# Patient Record
Sex: MALE | Race: BLACK OR AFRICAN AMERICAN | HISPANIC OR LATINO | Employment: FULL TIME | URBAN - METROPOLITAN AREA
[De-identification: names, ages, dates, MRNs, and addresses within clinical notes are randomized per-mention and may not be internally consistent; named-entity substitution may affect disease eponyms.]

---

## 2017-12-28 ENCOUNTER — GENERIC CONVERSION - ENCOUNTER (OUTPATIENT)
Dept: OTHER | Facility: OTHER | Age: 22
End: 2017-12-28

## 2018-01-23 NOTE — MISCELLANEOUS
Please contact our office at your convenience  It is important that we speak to you  REGARDING:   Porfavor contacte a  nuestra oficina  Es muy importante que hablemos con usted   SOBRE:          Scheduling an Appointment/ Programar erasmo Sylwia          Rescheduling  an Appointment/ Re-programar erasmo Sylwia     Cancelling an Appointment/Cancelar amado Sylwia     Your Lab/ X-ray Results/Resultados         Updating your Phone number or Address/ Actualizar  amado [employer]          X Verify your Primary Providor/ Verificar amado Proveedor primario     Other/Otro:     Please Contact Our Office to Schedule Your Appointment  It is Very Important That You See Your Provider for your  Routine Medical Care  If you are seeing a New Providor,  Please Contact our Office so we can update our Records  Thank You  Comuníquese con nuestra oficina para programar amado  sylwia  Es Muy Importante que usted nery amado proveedor para amado   [address] [facility] [place]  Si usted esta viendo un Proveedor  [place], [place] con Osorio Lele oficina para  actualizar   nuestro registros  [place]

## 2018-06-26 ENCOUNTER — HOSPITAL ENCOUNTER (EMERGENCY)
Facility: HOSPITAL | Age: 23
Discharge: HOME | End: 2018-06-26
Attending: EMERGENCY MEDICINE | Admitting: EMERGENCY MEDICINE

## 2018-06-26 VITALS
DIASTOLIC BLOOD PRESSURE: 70 MMHG | TEMPERATURE: 98.9 F | BODY MASS INDEX: 20.25 KG/M2 | OXYGEN SATURATION: 98 % | RESPIRATION RATE: 16 BRPM | SYSTOLIC BLOOD PRESSURE: 106 MMHG | HEART RATE: 68 BPM | WEIGHT: 137.13 LBS

## 2018-06-26 DIAGNOSIS — R11.0 NAUSEA: ICD-10-CM

## 2018-06-26 DIAGNOSIS — M79.10 GENERALIZED MUSCLE ACHE: Primary | ICD-10-CM

## 2018-06-26 LAB
ALBUMIN SERPL BCP-MCNC: 4.5 G/DL (ref 3.5–5)
ALP SERPL-CCNC: 52 U/L (ref 46–116)
ALT SERPL W P-5'-P-CCNC: 21 U/L (ref 12–78)
AMORPH PHOS CRY URNS QL MICRO: ABNORMAL /HPF
ANION GAP SERPL CALCULATED.3IONS-SCNC: 10 MMOL/L (ref 4–13)
AST SERPL W P-5'-P-CCNC: 21 U/L (ref 5–45)
BACTERIA UR QL AUTO: ABNORMAL /HPF
BASOPHILS # BLD AUTO: 0.02 THOUSANDS/ΜL (ref 0–0.1)
BASOPHILS NFR BLD AUTO: 0 % (ref 0–1)
BILIRUB SERPL-MCNC: 0.62 MG/DL (ref 0.2–1)
BILIRUB UR QL STRIP: ABNORMAL
BUN SERPL-MCNC: 11 MG/DL (ref 5–25)
CALCIUM SERPL-MCNC: 9.4 MG/DL (ref 8.3–10.1)
CHLORIDE SERPL-SCNC: 101 MMOL/L (ref 100–108)
CK MB SERPL-MCNC: <0.5 NG/ML (ref 0–5)
CK MB SERPL-MCNC: <1 % (ref 0–2.5)
CK SERPL-CCNC: 495 U/L (ref 39–308)
CLARITY UR: ABNORMAL
CO2 SERPL-SCNC: 28 MMOL/L (ref 21–32)
COLOR UR: YELLOW
CREAT SERPL-MCNC: 0.86 MG/DL (ref 0.6–1.3)
EOSINOPHIL # BLD AUTO: 0.01 THOUSAND/ΜL (ref 0–0.61)
EOSINOPHIL NFR BLD AUTO: 0 % (ref 0–6)
ERYTHROCYTE [DISTWIDTH] IN BLOOD BY AUTOMATED COUNT: 13.2 % (ref 11.6–15.1)
GFR SERPL CREATININE-BSD FRML MDRD: 141 ML/MIN/1.73SQ M
GLUCOSE SERPL-MCNC: 94 MG/DL (ref 65–140)
GLUCOSE UR STRIP-MCNC: NEGATIVE MG/DL
HCT VFR BLD AUTO: 41.1 % (ref 36.5–49.3)
HGB BLD-MCNC: 14.3 G/DL (ref 12–17)
HGB UR QL STRIP.AUTO: NEGATIVE
KETONES UR STRIP-MCNC: ABNORMAL MG/DL
LEUKOCYTE ESTERASE UR QL STRIP: NEGATIVE
LIPASE SERPL-CCNC: 77 U/L (ref 73–393)
LYMPHOCYTES # BLD AUTO: 1.27 THOUSANDS/ΜL (ref 0.6–4.47)
LYMPHOCYTES NFR BLD AUTO: 8 % (ref 14–44)
MCH RBC QN AUTO: 31.3 PG (ref 26.8–34.3)
MCHC RBC AUTO-ENTMCNC: 34.8 G/DL (ref 31.4–37.4)
MCV RBC AUTO: 90 FL (ref 82–98)
MONOCYTES # BLD AUTO: 1.47 THOUSAND/ΜL (ref 0.17–1.22)
MONOCYTES NFR BLD AUTO: 9 % (ref 4–12)
MUCOUS THREADS UR QL AUTO: ABNORMAL
NEUTROPHILS # BLD AUTO: 13.37 THOUSANDS/ΜL (ref 1.85–7.62)
NEUTS SEG NFR BLD AUTO: 83 % (ref 43–75)
NITRITE UR QL STRIP: NEGATIVE
NON-SQ EPI CELLS URNS QL MICRO: ABNORMAL /HPF
NRBC BLD AUTO-RTO: 0 /100 WBCS
OTHER STN SPEC: ABNORMAL
PH UR STRIP.AUTO: 7 [PH] (ref 4.5–8)
PLATELET # BLD AUTO: 182 THOUSANDS/UL (ref 149–390)
PMV BLD AUTO: 9.4 FL (ref 8.9–12.7)
POTASSIUM SERPL-SCNC: 3.8 MMOL/L (ref 3.5–5.3)
PROT SERPL-MCNC: 7.7 G/DL (ref 6.4–8.2)
PROT UR STRIP-MCNC: ABNORMAL MG/DL
RBC # BLD AUTO: 4.57 MILLION/UL (ref 3.88–5.62)
RBC #/AREA URNS AUTO: ABNORMAL /HPF
SODIUM SERPL-SCNC: 139 MMOL/L (ref 136–145)
SP GR UR STRIP.AUTO: 1.02 (ref 1–1.03)
UROBILINOGEN UR QL STRIP.AUTO: 4 E.U./DL
WBC # BLD AUTO: 16.14 THOUSAND/UL (ref 4.31–10.16)
WBC #/AREA URNS AUTO: ABNORMAL /HPF

## 2018-06-26 PROCEDURE — 99284 EMERGENCY DEPT VISIT MOD MDM: CPT

## 2018-06-26 PROCEDURE — 86618 LYME DISEASE ANTIBODY: CPT | Performed by: PHYSICIAN ASSISTANT

## 2018-06-26 PROCEDURE — 81002 URINALYSIS NONAUTO W/O SCOPE: CPT | Performed by: PHYSICIAN ASSISTANT

## 2018-06-26 PROCEDURE — 82553 CREATINE MB FRACTION: CPT | Performed by: PHYSICIAN ASSISTANT

## 2018-06-26 PROCEDURE — 36415 COLL VENOUS BLD VENIPUNCTURE: CPT | Performed by: PHYSICIAN ASSISTANT

## 2018-06-26 PROCEDURE — 82550 ASSAY OF CK (CPK): CPT | Performed by: PHYSICIAN ASSISTANT

## 2018-06-26 PROCEDURE — 83690 ASSAY OF LIPASE: CPT | Performed by: PHYSICIAN ASSISTANT

## 2018-06-26 PROCEDURE — 80053 COMPREHEN METABOLIC PANEL: CPT | Performed by: PHYSICIAN ASSISTANT

## 2018-06-26 PROCEDURE — 96374 THER/PROPH/DIAG INJ IV PUSH: CPT

## 2018-06-26 PROCEDURE — 81001 URINALYSIS AUTO W/SCOPE: CPT

## 2018-06-26 PROCEDURE — 96375 TX/PRO/DX INJ NEW DRUG ADDON: CPT

## 2018-06-26 PROCEDURE — 85025 COMPLETE CBC W/AUTO DIFF WBC: CPT | Performed by: PHYSICIAN ASSISTANT

## 2018-06-26 PROCEDURE — 86617 LYME DISEASE ANTIBODY: CPT | Performed by: PHYSICIAN ASSISTANT

## 2018-06-26 PROCEDURE — 96361 HYDRATE IV INFUSION ADD-ON: CPT

## 2018-06-26 RX ORDER — KETOROLAC TROMETHAMINE 30 MG/ML
30 INJECTION, SOLUTION INTRAMUSCULAR; INTRAVENOUS ONCE
Status: COMPLETED | OUTPATIENT
Start: 2018-06-26 | End: 2018-06-26

## 2018-06-26 RX ORDER — ACETAMINOPHEN 325 MG/1
975 TABLET ORAL ONCE
Status: COMPLETED | OUTPATIENT
Start: 2018-06-26 | End: 2018-06-26

## 2018-06-26 RX ORDER — ONDANSETRON 4 MG/1
TABLET, FILM COATED ORAL
Qty: 12 TABLET | Refills: 0 | Status: SHIPPED | OUTPATIENT
Start: 2018-06-26

## 2018-06-26 RX ORDER — ONDANSETRON 2 MG/ML
4 INJECTION INTRAMUSCULAR; INTRAVENOUS ONCE
Status: COMPLETED | OUTPATIENT
Start: 2018-06-26 | End: 2018-06-26

## 2018-06-26 RX ADMIN — KETOROLAC TROMETHAMINE 30 MG: 30 INJECTION, SOLUTION INTRAMUSCULAR at 07:53

## 2018-06-26 RX ADMIN — ONDANSETRON 4 MG: 2 INJECTION INTRAMUSCULAR; INTRAVENOUS at 07:56

## 2018-06-26 RX ADMIN — SODIUM CHLORIDE 1000 ML: 0.9 INJECTION, SOLUTION INTRAVENOUS at 07:42

## 2018-06-26 RX ADMIN — SODIUM CHLORIDE 1000 ML: 0.9 INJECTION, SOLUTION INTRAVENOUS at 09:14

## 2018-06-26 RX ADMIN — FAMOTIDINE 20 MG: 10 INJECTION, SOLUTION INTRAVENOUS at 07:59

## 2018-06-26 RX ADMIN — ACETAMINOPHEN 975 MG: 325 TABLET, FILM COATED ORAL at 08:02

## 2018-06-26 NOTE — ED PROVIDER NOTES
History  Chief Complaint   Patient presents with    Muscle Pain     onset 3 days ago with "bone and muscles are aching from my legs all the way up"  Denies known injury or history of such s/s  Reports intermittent headaches and nausea  Ambulatory with steady gait from waiting room to ED room  Patient is a 70-year-old male who reports starting approximately 6 days ago with generalized arthralgias and myalgias mostly legs/back/neck stating that the pain has been constant, worse with movement and range of motion  Patient did try Motrin and Aleve with no improvement  He has had some chills but denies fevers  Also has had intermittent headaches  Yesterday developed intermittent epigastric pain  Overall appetite is decreased  Denies blurred vision, photophobia, congestion, sore throat, cough, short chest pain, shortness of breath  Further denies vomiting, diarrhea, urinary symptoms  Significant medical history denied and patient takes no regular medications  None       Past Medical History:   Diagnosis Date    Asthma        History reviewed  No pertinent surgical history  History reviewed  No pertinent family history  I have reviewed and agree with the history as documented  Social History   Substance Use Topics    Smoking status: Current Every Day Smoker     Packs/day: 0 50    Smokeless tobacco: Not on file    Alcohol use Yes      Comment: socially        Review of Systems   Constitutional: Positive for chills  Negative for fever  HENT: Negative for congestion, rhinorrhea, sinus pain and sore throat  Eyes: Negative for visual disturbance  Respiratory: Negative for cough, shortness of breath and wheezing  Cardiovascular: Negative for chest pain  Gastrointestinal: Positive for abdominal pain and nausea  Negative for diarrhea and vomiting  Musculoskeletal: Positive for arthralgias and myalgias  Negative for neck stiffness  Neurological: Positive for headaches   Negative for dizziness  All other systems reviewed and are negative  Physical Exam  Physical Exam   Constitutional: He is oriented to person, place, and time  He appears well-developed and well-nourished  No distress  HENT:   Head: Normocephalic and atraumatic  Right Ear: Hearing, tympanic membrane, external ear and ear canal normal    Left Ear: Hearing, tympanic membrane, external ear and ear canal normal    Nose: Nose normal  No mucosal edema or rhinorrhea  Right sinus exhibits no maxillary sinus tenderness  Left sinus exhibits no maxillary sinus tenderness  Mouth/Throat: Uvula is midline, oropharynx is clear and moist and mucous membranes are normal  No oropharyngeal exudate  Eyes: Conjunctivae, EOM and lids are normal  Pupils are equal, round, and reactive to light  Neck: Normal range of motion  Neck supple  No Brudzinski's sign and no Kernig's sign noted  No cervical mid-line TTP, no paraspinal muscle TTP   Cardiovascular: Normal rate, regular rhythm and normal heart sounds  Pulmonary/Chest: Effort normal and breath sounds normal    Abdominal: Soft  Normal appearance and bowel sounds are normal  There is tenderness (mild epigastric TTP) in the epigastric area  There is no rigidity, no rebound and no guarding  Musculoskeletal:   Normal gait and station  Non-focal with FROM upper, lower extremities, neck, chest and back   Lymphadenopathy:     He has no cervical adenopathy  Neurological: He is alert and oriented to person, place, and time  Appropriate speech and behavior   Non-focal  No sensory deficits noted, no motor deficits noted       Vital Signs  ED Triage Vitals [06/26/18 0705]   Temperature Pulse Respirations Blood Pressure SpO2   99 8 °F (37 7 °C) 91 16 111/68 97 %      Temp Source Heart Rate Source Patient Position - Orthostatic VS BP Location FiO2 (%)   Oral Monitor Sitting Right arm --      Pain Score       7           Vitals:    06/26/18 0705 06/26/18 0900 06/26/18 0955   BP: 111/68 107/51 (!) 104/47   Pulse: 91 80 68   Patient Position - Orthostatic VS: Sitting Lying Lying       Visual Acuity      ED Medications  Medications   sodium chloride 0 9 % bolus 1,000 mL (0 mL Intravenous Stopped 6/26/18 0843)   ketorolac (TORADOL) injection 30 mg (30 mg Intravenous Given 6/26/18 0753)   ondansetron (ZOFRAN) injection 4 mg (4 mg Intravenous Given 6/26/18 0756)   famotidine (PEPCID) injection 20 mg (20 mg Intravenous Given 6/26/18 0759)   acetaminophen (TYLENOL) tablet 975 mg (975 mg Oral Given 6/26/18 0802)   sodium chloride 0 9 % bolus 1,000 mL (1,000 mL Intravenous New Bag 6/26/18 0914)       Diagnostic Studies  Results Reviewed     Procedure Component Value Units Date/Time    CKMB [[id number]]  (Normal) Collected:  06/26/18 0735    Lab Status:  Final result Specimen:  Blood from Arm, Right Updated:  06/26/18 0927     CK-MB Index <1 0 %      CK-MB FRACTION <0 5 ng/mL     Lipase [[id number]]  (Normal) Collected:  06/26/18 0735    Lab Status:  Final result Specimen:  Blood from Arm, Right Updated:  06/26/18 0845     Lipase 77 u/L     Urine Microscopic [[id number]]  (Abnormal) Collected:  06/26/18 0823    Lab Status:  Final result Specimen:  Urine from Urine, Clean Catch Updated:  06/26/18 0841     RBC, UA 2-4 (A) /hpf      WBC, UA 1-2 (A) /hpf      Epithelial Cells Occasional /hpf      Bacteria, UA Occasional /hpf      AMORPH PHOSPATES Moderate /hpf      OTHER OBSERVATIONS Yeast Cells Present     MUCOUS THREADS Moderate (A)    CK Total with Reflex CKMB [[id number]]  (Abnormal) Collected:  06/26/18 0735    Lab Status:  Final result Specimen:  Blood from Arm, Right Updated:  06/26/18 0835     Total  (H) U/L     POCT urinalysis dipstick [[id number]]  (Abnormal) Resulted:  06/26/18 0820    Lab Status:  Final result Specimen:  Urine Updated:  06/26/18 0820    Comprehensive metabolic panel [[id number]] Collected:  06/26/18 0735    Lab Status:  Final result Specimen:  Blood from Arm, Right Updated:  06/26/18 1174 Sodium 139 mmol/L      Potassium 3 8 mmol/L      Chloride 101 mmol/L      CO2 28 mmol/L      Anion Gap 10 mmol/L      BUN 11 mg/dL      Creatinine 0 86 mg/dL      Glucose 94 mg/dL      Calcium 9 4 mg/dL      AST 21 U/L      ALT 21 U/L      Alkaline Phosphatase 52 U/L      Total Protein 7 7 g/dL      Albumin 4 5 g/dL      Total Bilirubin 0 62 mg/dL      eGFR 141 ml/min/1 73sq m     Narrative:         National Kidney Disease Education Program recommendations are as follows:  GFR calculation is accurate only with a steady state creatinine  Chronic Kidney disease less than 60 ml/min/1 73 sq  meters  Kidney failure less than 15 ml/min/1 73 sq  meters      ED Urine Macroscopic [[id number]]  (Abnormal) Collected:  06/26/18 0823    Lab Status:  Final result Specimen:  Urine Updated:  06/26/18 0818     Color, UA Yellow     Clarity, UA Cloudy     pH, UA 7 0     Leukocytes, UA Negative     Nitrite, UA Negative     Protein, UA 30 (1+) (A) mg/dl      Glucose, UA Negative mg/dl      Ketones, UA 15 (1+) (A) mg/dl      Urobilinogen, UA 4 0 (A) E U /dl      Bilirubin, UA Interference- unable to analyze (A)     Blood, UA Negative     Specific Gravity, UA 1 025    Narrative:       CLINITEK RESULT    CBC and differential [[id number]]  (Abnormal) Collected:  06/26/18 0735    Lab Status:  Final result Specimen:  Blood from Arm, Right Updated:  06/26/18 0749     WBC 16 14 (H) Thousand/uL      RBC 4 57 Million/uL      Hemoglobin 14 3 g/dL      Hematocrit 41 1 %      MCV 90 fL      MCH 31 3 pg      MCHC 34 8 g/dL      RDW 13 2 %      MPV 9 4 fL      Platelets 656 Thousands/uL      nRBC 0 /100 WBCs      Neutrophils Relative 83 (H) %      Lymphocytes Relative 8 (L) %      Monocytes Relative 9 %      Eosinophils Relative 0 %      Basophils Relative 0 %      Neutrophils Absolute 13 37 (H) Thousands/µL      Lymphocytes Absolute 1 27 Thousands/µL      Monocytes Absolute 1 47 (H) Thousand/µL      Eosinophils Absolute 0 01 Thousand/µL      Basophils Absolute 0 02 Thousands/µL     Lyme Antibody Profile with reflex to [facility] [[id number]] Collected:  06/26/18 0735    Lab Status: In process Specimen:  Blood from Arm, Right Updated:  06/26/18 0747                 No orders to display              Procedures  Procedures       Phone Contacts  ED Phone Contact    ED Course  ED Course as of Jun 26 1045   Tue Jun 26, 2018   5826 CBC shows elevated WBC count  UA shows evidence of dehydration  CMP, lipase unremarkable  Upon reassessment patient reports some improvement of his symptoms  Results as above discussed  Plan is to administer a second bolus of fluids then reassess  [phone] Patient continues to remain both clinically and hemodynamically stable  He states symptoms have improved  His epigastric pain & nausea has resolved  Symptoms likely secondary to viral syndrome  Recommend continue symptomatic treatment including Tylenol, ibuprofen, fluids  Will prescribe Zofran as needed for nausea  He is stable for discharge  Recommend establish and follow up with PCP for recheck  Both verbal and written discharge instructions provided  Adequate time was allowed to answer any questions  Recommend follow up with family doctor or referral physician as discussed, sooner if symptoms persist, change or worsen  Red flag symptoms as well as reasons to return to the ED discussed  Pt verbally understood treatment plan and was discharged home in stable condition  [facility]  Number of Diagnoses or Management Options  Diagnosis management comments: Plan is to check routine labs including CBC, CMP, lipase, urine testing, Lyme testing and CK values  Will administer fluids, Toradol, Tylenol, Zofran and Pepcid for symptoms and reassess      CritCare Time    Disposition  Final diagnoses:   Generalized muscle ache   Nausea     Time reflects when diagnosis was documented in both MDM as applicable and the Disposition within this note     Time User Action Codes Description Comment    6/26/2018 10:42 AM Selvin EASTMAN Add [M79 1] Generalized muscle ache     6/26/2018 10:42 AM Selvin EASTMAN Add [R11 0] Nausea       ED Disposition     ED Disposition Condition Comment    Discharge  [address] [facility] discharge to home/self care  Condition at discharge: Good        Follow-up Information     Follow up With Specialties Details Why Contact Info    [employer]  Schedule an appointment as soon as possible for a visit Recommend establish with family doctor and schedule appointment ASAP for recheck  [phone]            Patient's Medications   Discharge Prescriptions    ONDANSETRON (ZOFRAN) 4 MG TABLET    Take 1-2 tabs every 8 hours as needed for nausea/vomiting       Start Date: 6/26/2018 End Date: --       Order Dose: --       Quantity: 12 tablet    Refills: 0     No discharge procedures on file      ED Provider  Electronically Signed by           Jocelyn Grace PA-C  06/26/18 7684

## 2018-06-26 NOTE — DISCHARGE INSTRUCTIONS
Viral Syndrome, Ambulatory Care   GENERAL INFORMATION:   Viral syndrome  is a term healthcare providers use for general symptoms of a viral infection that has no clear cause  Common symptoms include the following:   · Fever and chills, or a rash    · Runny or stuffy nose     · Cough, sore throat, or hoarseness     · Headache, or pain and pressure around your eyes     · Muscle aches and joint pain     · Shortness of breath or wheezing     · Abdominal pain, cramps, and diarrhea     · Nausea, vomiting, or loss of appetite  Seek immediate care for the following symptoms:   · Continued vomiting and diarrhea    · Chest pain or trouble breathing  Treatment for a viral syndrome  may include medicines to decrease fever, cough, or stuffy nose  You may also need medicines to relieve a rash, itching, or help treat the viral infection  Manage your symptoms:  Drink liquids as directed to help prevent dehydration  Ask how much liquid to drink each day and which liquids are best for you  You may need to drink an oral rehydration solution (ORS)  An ORS has the right amounts of water, salts, and sugar you need to replace body fluids  Prevent the spread of viral syndrome:   · Wash your hands often  Use soap and water  Wash your hands after you use the bathroom, change a child's diapers, or sneeze  Wash your hands before you prepare or eat food  Use a gel hand  if soap and water are not available  · Wear a mask  to help prevent spreading the virus to others  · Cook and handle food properly  Cook food completely through  Clean food preparation surfaces with a disinfectant  · Ask about vaccinations  You may need an influenza (flu) vaccine, pneumococcal vaccine, or meningococcal vaccine  These vaccines help prevent the flu, pneumonia, and meningococcal disease  Follow up with your healthcare provider as directed:  Write down your questions so you remember to ask them during your visits     CARE AGREEMENT: You have the right to help plan your care  Learn about your health condition and how it may be treated  Discuss treatment options with your caregivers to decide what care you want to receive  You always have the right to refuse treatment  The above information is an  only  It is not intended as medical advice for individual conditions or treatments  Talk to your doctor, nurse or pharmacist before following any medical regimen to see if it is safe and effective for you  © 2014 [address] is for End User's use only and may not be sold, redistributed or otherwise used for commercial purposes  All illustrations and images included in CareNotes® are the copyrighted property of A D A M , [employer]  or [address]  Acute Nausea and Vomiting   WHAT YOU NEED TO KNOW:   Acute nausea and vomiting start suddenly, worsen quickly, and last a short time  DISCHARGE INSTRUCTIONS:   Return to the emergency department if:   · You see blood in your vomit or your bowel movements  · You have sudden, severe pain in your chest and upper abdomen after hard vomiting or retching  · You have swelling in your neck and chest      · You are dizzy, cold, and thirsty and your eyes and mouth are dry  · You are urinating very little or not at all  · You have muscle weakness, leg cramps, and trouble breathing  · Your heart is beating much faster than normal      · You continue to vomit for more than 48 hours  Contact your healthcare provider if:   · You have frequent dry heaves (vomiting but nothing comes out)  · Your nausea and vomiting does not get better or go away after you use medicine  · You have questions or concerns about your condition or treatment  Medicines: You may need any of the following:  · Medicines  may be given to calm your stomach and stop your vomiting   You may also need medicines to help you feel more relaxed or to stop nausea and vomiting caused by motion sickness  · Gastrointestinal stimulants  are used to help empty your stomach and bowels  This may help decrease nausea and vomiting  · Take your medicine as directed  Contact your healthcare provider if you think your medicine is not helping or if you have side effects  Tell him or her if you are allergic to any medicine  Keep a list of the medicines, vitamins, and herbs you take  Include the amounts, and when and why you take them  Bring the list or the pill bottles to follow-up visits  Carry your medicine list with you in case of an emergency  Prevent or manage acute nausea and vomiting:   · Do not drink alcohol  Alcohol may upset or irritate your stomach  Too much alcohol can also cause acute nausea and vomiting  · Control stress  Headaches due to stress may cause nausea and vomiting  Find ways to relax and manage your stress  Get more rest and sleep  · Drink more liquids as directed  Vomiting can lead to dehydration  It is important to drink more liquids to help replace lost body fluids  Ask your healthcare provider how much liquid to drink each day and which liquids are best for you  Your provider may recommend that you drink an oral rehydration solution (ORS)  ORS contains water, salts, and sugar that are needed to replace the lost body fluids  Ask what kind of ORS to use, how much to drink, and where to get it  · Eat smaller meals, more often  Eat small amounts of food every 2 to 3 hours, even if you are not hungry  Food in your stomach may decrease your nausea  · Talk to your healthcare provider before you take over-the-counter (OTC) medicines  These medicines can cause serious problems if you use certain other medicines, or you have a medical condition  You may have problems if you use too much or use them for longer than the label says  Follow directions on the label carefully    Follow up with your healthcare provider as directed:  Write down your questions so you remember to ask them during your follow-up visits  © 2017 [address] Information is for End User's use only and may not be sold, redistributed or otherwise used for commercial purposes  All illustrations and images included in CareNotes® are the copyrighted property of A D A M , Inc  or [employer]  The above information is an  only  It is not intended as medical advice for individual conditions or treatments  Talk to your doctor, nurse or pharmacist before following any medical regimen to see if it is safe and effective for you

## 2018-06-27 LAB
B BURGDOR IGG SER IA-ACNC: 0.39
B BURGDOR IGM SER IA-ACNC: 1.09

## 2018-06-29 LAB
B BURGDOR IGG PATRN SER IB-IMP: NEGATIVE
B BURGDOR IGM PATRN SER IB-IMP: NEGATIVE
B BURGDOR18KD IGG SER QL IB: NORMAL
B BURGDOR23KD IGG SER QL IB: NORMAL
B BURGDOR23KD IGM SER QL IB: NORMAL
B BURGDOR28KD IGG SER QL IB: NORMAL
B BURGDOR30KD IGG SER QL IB: NORMAL
B BURGDOR39KD IGG SER QL IB: NORMAL
B BURGDOR39KD IGM SER QL IB: NORMAL
B BURGDOR41KD IGG SER QL IB: NORMAL
B BURGDOR41KD IGM SER QL IB: NORMAL
B BURGDOR45KD IGG SER QL IB: NORMAL
B BURGDOR58KD IGG SER QL IB: NORMAL
B BURGDOR66KD IGG SER QL IB: NORMAL
B BURGDOR93KD IGG SER QL IB: NORMAL

## 2020-01-12 ENCOUNTER — HOSPITAL ENCOUNTER (EMERGENCY)
Facility: HOSPITAL | Age: 25
Discharge: HOME | End: 2020-01-12
Attending: EMERGENCY MEDICINE
Payer: COMMERCIAL

## 2020-01-12 VITALS
TEMPERATURE: 98.8 F | HEART RATE: 99 BPM | DIASTOLIC BLOOD PRESSURE: 65 MMHG | BODY MASS INDEX: 19.98 KG/M2 | HEIGHT: 70 IN | RESPIRATION RATE: 18 BRPM | OXYGEN SATURATION: 99 % | WEIGHT: 139.55 LBS | SYSTOLIC BLOOD PRESSURE: 130 MMHG

## 2020-01-12 DIAGNOSIS — J10.1 INFLUENZA B: Primary | ICD-10-CM

## 2020-01-12 LAB
FLUAV RNA NPH QL NAA+PROBE: ABNORMAL
FLUBV RNA NPH QL NAA+PROBE: DETECTED
RSV RNA NPH QL NAA+PROBE: ABNORMAL

## 2020-01-12 PROCEDURE — 99284 EMERGENCY DEPT VISIT MOD MDM: CPT | Performed by: PHYSICIAN ASSISTANT

## 2020-01-12 PROCEDURE — 87631 RESP VIRUS 3-5 TARGETS: CPT | Performed by: PHYSICIAN ASSISTANT

## 2020-01-12 PROCEDURE — 99283 EMERGENCY DEPT VISIT LOW MDM: CPT

## 2020-01-12 RX ORDER — ACETAMINOPHEN 325 MG/1
650 TABLET ORAL ONCE
Status: COMPLETED | OUTPATIENT
Start: 2020-01-12 | End: 2020-01-12

## 2020-01-12 RX ADMIN — ACETAMINOPHEN 650 MG: 325 TABLET, FILM COATED ORAL at 19:59

## 2020-01-13 NOTE — ED PROVIDER NOTES
History  Chief Complaint   Patient presents with    Cold Like Symptoms     muscle aches, cough, runny nose, sore thorat and headace  Symptoms started Friday      25year old male presents ambulatory from home for evaluation of cough and congestion  He notes onset of symptoms Friday night  C/o runny nose, congestion, cough, sore throat, headache and body aches  Denies N/V/D, abdominal pain  Denies chest pain or SOB  Denies sick contacts  +smoker  Took ibuprofen today about 5 hours ago with little relief  History provided by:  Patient   used: No        Prior to Admission Medications   Prescriptions Last Dose Informant Patient Reported? Taking?   ondansetron (ZOFRAN) 4 mg tablet Not Taking at Unknown time  No No   Sig: Take 1-2 tabs every 8 hours as needed for nausea/vomiting   Patient not taking: Reported on 1/12/2020      Facility-Administered Medications: None       Past Medical History:   Diagnosis Date    Asthma        History reviewed  No pertinent surgical history  History reviewed  No pertinent family history  I have reviewed and agree with the history as documented  Social History     Tobacco Use    Smoking status: Current Every Day Smoker     Packs/day: 0 50    Smokeless tobacco: Never Used   Substance Use Topics    Alcohol use: Yes     Comment: socially    Drug use: Yes     Types: Marijuana     Comment: daily         Review of Systems   Constitutional: Positive for chills, fatigue and fever  HENT: Positive for congestion, rhinorrhea and sore throat  Eyes: Negative  Negative for visual disturbance  Respiratory: Positive for cough  Negative for shortness of breath and wheezing  Cardiovascular: Negative  Negative for chest pain, palpitations and leg swelling  Gastrointestinal: Negative  Negative for abdominal pain, constipation, diarrhea, nausea and vomiting  Genitourinary: Negative  Negative for dysuria, flank pain, frequency and hematuria  Musculoskeletal: Positive for arthralgias and myalgias  Negative for back pain and neck pain  Skin: Negative  Negative for rash  Neurological: Positive for headaches  Negative for dizziness and light-headedness  Psychiatric/Behavioral: Negative  Negative for confusion  All other systems reviewed and are negative  Physical Exam  Physical Exam   Constitutional: He is oriented to person, place, and time  He appears well-developed and well-nourished  Non-toxic appearance  No distress  HENT:   Head: Normocephalic and atraumatic  Right Ear: Hearing, tympanic membrane, external ear and ear canal normal    Left Ear: Hearing, tympanic membrane, external ear and ear canal normal    Nose: Rhinorrhea present  Mouth/Throat: Uvula is midline, oropharynx is clear and moist and mucous membranes are normal  No oropharyngeal exudate  Eyes: Pupils are equal, round, and reactive to light  Conjunctivae and EOM are normal  No scleral icterus  Neck: Trachea normal and normal range of motion  Neck supple  No JVD present  No tracheal deviation present  Cardiovascular: Normal rate, regular rhythm, normal heart sounds and normal pulses  No murmur heard  Pulmonary/Chest: Effort normal and breath sounds normal  No respiratory distress  He has no wheezes  He has no rhonchi  He has no rales  Abdominal: Soft  Normal appearance and bowel sounds are normal  There is no hepatosplenomegaly  There is no tenderness  There is no rebound, no guarding and no CVA tenderness  No hernia  Musculoskeletal: Normal range of motion  He exhibits no edema or tenderness  Neurological: He is alert and oriented to person, place, and time  No cranial nerve deficit or sensory deficit  He exhibits normal muscle tone  Gait normal  GCS eye subscore is 4  GCS verbal subscore is 5  GCS motor subscore is 6  Skin: Skin is warm and dry  Capillary refill takes less than 2 seconds  No rash noted  He is not diaphoretic  No cyanosis   Nails show no clubbing  Psychiatric: He has a normal mood and affect  His speech is normal and behavior is normal    Nursing note and vitals reviewed  Vital Signs  ED Triage Vitals [01/12/20 1934]   Temp Pulse Respirations Blood Pressure SpO2   -- 104 18 126/62 99 %      Temp src Heart Rate Source Patient Position - Orthostatic VS BP Location FiO2 (%)   -- Monitor Sitting Right arm --      Pain Score       8           Vitals:    01/12/20 1934   BP: 126/62   Pulse: 104   Patient Position - Orthostatic VS: Sitting         Visual Acuity      ED Medications  Medications   acetaminophen (TYLENOL) tablet 650 mg (650 mg Oral Given 1/12/20 1959)       Diagnostic Studies  Results Reviewed     Procedure Component Value Units Date/Time    Influenza A/B and RSV PCR [[id number]]  (Abnormal) Collected:  01/12/20 1953    Lab Status:  Final result Specimen:  Nasopharyngeal Swab Updated:  01/12/20 2045     INFLUENZA A PCR None Detected     INFLUENZA B PCR Detected     RSV PCR None Detected                 No orders to display              Procedures  Procedures         ED Course  ED Course as of Jan 12 2104   Reese Tom Jan 12, 2020 2046 INFLU A PCR: None Detected   2046 INFLU B PCR(!): Detected   2046 RSV PCR: None Detected   2046 Findings reviewed with pt  Discussed usual course and treatment of influenza  We discussed tamiflu and pt declined Rx  Advised rest, fluids, OTC meds as needed for symptoms  Continue to alternate OTC tylenol and ibuprofen as needed for fever/discomfort  Strict return precautions outlined  Advised outpatient follow up with PCP in 3-5 days if not improving or return to ER for change in condition as outlined  Pt verbalized understanding and had no further questions  Note for work provided                              MDM  Number of Diagnoses or Management Options  Influenza B: new and requires workup     Amount and/or Complexity of Data Reviewed  Clinical lab tests: ordered and reviewed  Decide to obtain previous medical records or to obtain history from someone other than the patient: yes  Obtain history from someone other than the patient: yes  Review and summarize past medical records: yes    Patient Progress  Patient progress: improved        Disposition  Final diagnoses:   Influenza B     Time reflects when diagnosis was documented in both MDM as applicable and the Disposition within this note     Time User Action Codes Description Comment    1/12/2020  8:47 PM Rodolfo Macias Add [J10 1] Influenza B       ED Disposition     ED Disposition Condition Date/Time Comment    Discharge Stable Sun Jan 12, 2020  8:47 PM Yue Hayden discharge to home/self care  Follow-up Information     Follow up With Specialties Details Why Contact Info Additional Information    [facility] Emergency Department Emergency Medicine  As needed [address] [place] ED, [address], [address]          Patient's Medications   Discharge Prescriptions    No medications on file     No discharge procedures on file      ED Provider  Electronically Signed by           Elzbieta Martinez PA-C  01/12/20 6125

## 2020-01-13 NOTE — DISCHARGE INSTRUCTIONS
Rest, fluids  OTC medications as needed for symptoms  Continue to alternate OTC tylenol and ibuprofen as needed for fever/discomfort  Follow up with PCP in 3-5 days if not improving or return as needed

## 2021-09-22 ENCOUNTER — HOSPITAL ENCOUNTER (EMERGENCY)
Facility: HOSPITAL | Age: 26
Discharge: HOME | End: 2021-09-22
Attending: EMERGENCY MEDICINE

## 2021-09-22 VITALS
SYSTOLIC BLOOD PRESSURE: 154 MMHG | OXYGEN SATURATION: 98 % | HEIGHT: 70 IN | WEIGHT: 140 LBS | BODY MASS INDEX: 20.04 KG/M2 | TEMPERATURE: 98.9 F | RESPIRATION RATE: 18 BRPM | DIASTOLIC BLOOD PRESSURE: 78 MMHG | HEART RATE: 84 BPM

## 2021-09-22 DIAGNOSIS — Z20.822 ENCOUNTER FOR LABORATORY TESTING FOR COVID-19 VIRUS: Primary | ICD-10-CM

## 2021-09-22 LAB — SARS-COV-2 RNA RESP QL NAA+PROBE: NEGATIVE

## 2021-09-22 PROCEDURE — 99281 EMR DPT VST MAYX REQ PHY/QHP: CPT | Performed by: PHYSICIAN ASSISTANT

## 2021-09-22 PROCEDURE — U0005 INFEC AGEN DETEC AMPLI PROBE: HCPCS | Performed by: PHYSICIAN ASSISTANT

## 2021-09-22 PROCEDURE — U0003 INFECTIOUS AGENT DETECTION BY NUCLEIC ACID (DNA OR RNA); SEVERE ACUTE RESPIRATORY SYNDROME CORONAVIRUS 2 (SARS-COV-2) (CORONAVIRUS DISEASE [COVID-19]), AMPLIFIED PROBE TECHNIQUE, MAKING USE OF HIGH THROUGHPUT TECHNOLOGIES AS DESCRIBED BY CMS-2020-01-R: HCPCS | Performed by: PHYSICIAN ASSISTANT

## 2021-09-22 PROCEDURE — 99282 EMERGENCY DEPT VISIT SF MDM: CPT

## 2021-09-22 NOTE — DISCHARGE INSTRUCTIONS
If you are sick:    + Stay home  If you feel sick, call your health provider and they will tell you what to do next  We are reserving COVID-19 tests for people who exhibit symptoms in order to conserve resources  + Download the [employer] mukul and use the Video Visit feature to connect with a doctor      + Call the [address] [place] [address] [place] : [phone]  ([phone])  and choose option 7           or          email  them at [employer]

## 2021-09-22 NOTE — ED PROVIDER NOTES
History  Chief Complaint   Patient presents with    Labs Only     Patient presents to the emergency department today for laboratory testing for coronavirus  He was around a member of his family yesterday has since tested positive for COVID  He is completely asymptomatic he has no chronic medical conditions he is not vaccinated against COVID but he does smoke cigarettes  Prior to Admission Medications   Prescriptions Last Dose Informant Patient Reported? Taking?   ondansetron (ZOFRAN) 4 mg tablet   No No   Sig: Take 1-2 tabs every 8 hours as needed for nausea/vomiting   Patient not taking: Reported on 1/12/2020      Facility-Administered Medications: None       Past Medical History:   Diagnosis Date    Asthma        No past surgical history on file  No family history on file  I have reviewed and agree with the history as documented  E-Cigarette/Vaping     E-Cigarette/Vaping Substances     Social History     Tobacco Use    Smoking status: Current Every Day Smoker     Packs/day: 0 50    Smokeless tobacco: Never Used   Substance Use Topics    Alcohol use: Yes     Comment: socially    Drug use: Yes     Types: Marijuana     Comment: daily        Review of Systems   Constitutional: Negative  HENT: Negative  Eyes: Negative  Respiratory: Negative  Cardiovascular: Negative  Gastrointestinal: Negative  Endocrine: Negative  Genitourinary: Negative  Musculoskeletal: Negative  Skin: Negative  Allergic/Immunologic: Negative  Neurological: Negative  Hematological: Negative  Psychiatric/Behavioral: Negative  All other systems reviewed and are negative  Physical Exam  Physical Exam  Vitals reviewed  Constitutional:       General: He is not in acute distress  Appearance: Normal appearance  He is normal weight  He is not ill-appearing, toxic-appearing or diaphoretic  HENT:      Head: Normocephalic and atraumatic  Eyes:      General: No scleral icterus  Right eye: No discharge  Left eye: No discharge  Extraocular Movements: Extraocular movements intact  Conjunctiva/sclera: Conjunctivae normal    Cardiovascular:      Rate and Rhythm: Normal rate  Pulmonary:      Effort: Pulmonary effort is normal  No respiratory distress  Breath sounds: Normal breath sounds  No stridor  No wheezing, rhonchi or rales  Chest:      Chest wall: No tenderness  Musculoskeletal:         General: Normal range of motion  Cervical back: Normal range of motion  Skin:     General: Skin is warm  Capillary Refill: Capillary refill takes less than 2 seconds  Neurological:      General: No focal deficit present  Mental Status: He is alert and oriented to person, place, and time  Mental status is at baseline  Psychiatric:         Mood and Affect: Mood normal          Behavior: Behavior normal          Thought Content: Thought content normal          Judgment: Judgment normal          Vital Signs  ED Triage Vitals [09/22/21 1809]   Temperature Pulse Respirations Blood Pressure SpO2   98 9 °F (37 2 °C) 84 18 154/78 98 %      Temp Source Heart Rate Source Patient Position - Orthostatic VS BP Location FiO2 (%)   Tympanic Monitor Sitting Right arm --      Pain Score       --           Vitals:    09/22/21 1809   BP: 154/78   Pulse: 84   Patient Position - Orthostatic VS: Sitting         Visual Acuity      ED Medications  Medications - No data to display    Diagnostic Studies  Results Reviewed     Procedure Component Value Units Date/Time    Novel Coronavirus [facility] - 24 Hour Routine [[id number]] Collected: 09/22/21 1811    Lab Status:  In process Specimen: Nares from Nose Updated: 09/22/21 1821                 No orders to display              Procedures  Procedures         ED Course  ED Course as of Sep 22 1823   Wed Sep 22, 2021   1810 Blood Pressure: 154/78   1810 Temperature: 98 9 °F (37 2 °C)   1811 Pulse: 84   1811 Respirations: 18 1811 SpO2: 98 %                             SBIRT 22yo+      Most Recent Value   SBIRT (25 yo +)   In order to provide better care to our patients, we are screening all of our patients for alcohol and drug use  Would it be okay to ask you these screening questions? Yes Filed at: 09/22/2021 1811   Initial Alcohol Screen: US AUDIT-C    1  How often do you have a drink containing alcohol?  0 Filed at: 09/22/2021 1811   2  How many drinks containing alcohol do you have on a typical day you are drinking? 0 Filed at: 09/22/2021 1811   3a  Male UNDER 65: How often do you have five or more drinks on one occasion? 0 Filed at: 09/22/2021 1811   3b  FEMALE Any Age, or MALE 65+: How often do you have 4 or more drinks on one occassion? 0 Filed at: 09/22/2021 1811   Audit-C Score  0 Filed at: 09/22/2021 1811   BALDO: How many times in the past year have you    Used an illegal drug or used a prescription medication for non-medical reasons? Never Filed at: 09/22/2021 1811                    MDM    Disposition  Final diagnoses:   Encounter for laboratory testing for COVID-19 virus     Time reflects when diagnosis was documented in both MDM as applicable and the Disposition within this note     Time User Action Codes Description Comment    9/22/2021  6:20 PM Kyle Poster Add [T18 456] Encounter for laboratory testing for COVID-19 virus       ED Disposition     ED Disposition Condition Date/Time Comment    Discharge Stable Wed Sep 22, 2021  6:20 PM Demarcus Finch discharge to home/self care  Follow-up Information    None         Patient's Medications   Discharge Prescriptions    No medications on file     No discharge procedures on file      PDMP Review     None          ED Provider  Electronically Signed by           Marry Lino PA-C  09/22/21 182

## 2021-10-03 ENCOUNTER — OFFICE VISIT (OUTPATIENT)
Dept: URGENT CARE | Facility: CLINIC | Age: 26
End: 2021-10-03

## 2021-10-03 VITALS
TEMPERATURE: 98.4 F | HEIGHT: 70 IN | HEART RATE: 66 BPM | OXYGEN SATURATION: 98 % | BODY MASS INDEX: 19.33 KG/M2 | WEIGHT: 135 LBS | RESPIRATION RATE: 18 BRPM

## 2021-10-03 DIAGNOSIS — B34.9 VIRAL SYNDROME: Primary | ICD-10-CM

## 2021-10-03 PROCEDURE — 99213 OFFICE O/P EST LOW 20 MIN: CPT | Performed by: PHYSICIAN ASSISTANT

## 2021-10-03 PROCEDURE — U0003 INFECTIOUS AGENT DETECTION BY NUCLEIC ACID (DNA OR RNA); SEVERE ACUTE RESPIRATORY SYNDROME CORONAVIRUS 2 (SARS-COV-2) (CORONAVIRUS DISEASE [COVID-19]), AMPLIFIED PROBE TECHNIQUE, MAKING USE OF HIGH THROUGHPUT TECHNOLOGIES AS DESCRIBED BY CMS-2020-01-R: HCPCS | Performed by: PHYSICIAN ASSISTANT

## 2021-10-03 PROCEDURE — U0005 INFEC AGEN DETEC AMPLI PROBE: HCPCS | Performed by: PHYSICIAN ASSISTANT

## 2021-10-04 LAB — SARS-COV-2 RNA RESP QL NAA+PROBE: POSITIVE

## 2023-03-20 ENCOUNTER — OFFICE VISIT (OUTPATIENT)
Dept: URGENT CARE | Facility: MEDICAL CENTER | Age: 28
End: 2023-03-20

## 2023-03-20 ENCOUNTER — APPOINTMENT (OUTPATIENT)
Dept: RADIOLOGY | Facility: MEDICAL CENTER | Age: 28
End: 2023-03-20

## 2023-03-20 VITALS
WEIGHT: 135 LBS | HEIGHT: 70 IN | RESPIRATION RATE: 20 BRPM | HEART RATE: 68 BPM | OXYGEN SATURATION: 99 % | TEMPERATURE: 98.3 F | BODY MASS INDEX: 19.33 KG/M2 | DIASTOLIC BLOOD PRESSURE: 84 MMHG | SYSTOLIC BLOOD PRESSURE: 120 MMHG

## 2023-03-20 DIAGNOSIS — T14.8XXA HYPERFLEXION INJURY: Primary | ICD-10-CM
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
## 2023-03-20 DIAGNOSIS — T14.8XXA HYPERFLEXION INJURY: ICD-10-CM
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
## 2023-03-20 NOTE — LETTER
March 20, 2023     Patient: Jaime Green   YOB: 1995   Date of Visit: 3/20/2023       To Whom it May Concern:    Ailyn Wheeler was seen in my clinic on 3/20/2023  He may return to work on 03/21/2023, he may wear wirst braces as needed  If you have any questions or concerns, please don't hesitate to call           Sincerely,          RITESH Bashir        CC: No Recipients

## 2023-03-20 NOTE — PATIENT INSTRUCTIONS
You make take Over the Counter Tylenol (Acetaminophen) and/or Motrin (Ibuprofen) as needed, as directed on packaging  Your xray was read by the provider you saw today in the office as a preliminary result  Your xray will be reviewed and an official reading will be provided by a Radiologist  If you have access to My Chart you can see these results there  Also if there is any significant findings you will be contacted with those results

## 2023-05-18 ENCOUNTER — HOSPITAL ENCOUNTER (EMERGENCY)
Facility: HOSPITAL | Age: 28
Discharge: HOME | End: 2023-05-18
Attending: EMERGENCY MEDICINE

## 2023-05-18 VITALS
HEART RATE: 68 BPM | HEIGHT: 70 IN | OXYGEN SATURATION: 100 % | BODY MASS INDEX: 19.28 KG/M2 | SYSTOLIC BLOOD PRESSURE: 140 MMHG | DIASTOLIC BLOOD PRESSURE: 56 MMHG | TEMPERATURE: 98.6 F | WEIGHT: 134.7 LBS | RESPIRATION RATE: 16 BRPM

## 2023-05-18 DIAGNOSIS — M62.830 SPASM OF THORACIC BACK MUSCLE: Primary | ICD-10-CM

## 2023-05-18 RX ORDER — METHOCARBAMOL 500 MG/1
500 TABLET, FILM COATED ORAL EVERY 8 HOURS PRN
Qty: 15 TABLET | Refills: 0 | Status: SHIPPED | OUTPATIENT
Start: 2023-05-18

## 2023-05-18 RX ORDER — KETOROLAC TROMETHAMINE 30 MG/ML
30 INJECTION, SOLUTION INTRAMUSCULAR; INTRAVENOUS ONCE
Status: COMPLETED | OUTPATIENT
Start: 2023-05-18 | End: 2023-05-18

## 2023-05-18 RX ORDER — METHOCARBAMOL 500 MG/1
500 TABLET, FILM COATED ORAL ONCE
Status: COMPLETED | OUTPATIENT
Start: 2023-05-18 | End: 2023-05-18

## 2023-05-18 RX ORDER — IBUPROFEN 600 MG/1
600 TABLET ORAL EVERY 6 HOURS PRN
Qty: 20 TABLET | Refills: 0 | Status: SHIPPED | OUTPATIENT
Start: 2023-05-18

## 2023-05-18 RX ORDER — ACETAMINOPHEN 325 MG/1
975 TABLET ORAL ONCE
Status: COMPLETED | OUTPATIENT
Start: 2023-05-18 | End: 2023-05-18

## 2023-05-18 RX ADMIN — KETOROLAC TROMETHAMINE 30 MG: 30 INJECTION, SOLUTION INTRAMUSCULAR; INTRAVENOUS at 09:50

## 2023-05-18 RX ADMIN — ACETAMINOPHEN 325MG 975 MG: 325 TABLET ORAL at 09:47

## 2023-05-18 RX ADMIN — METHOCARBAMOL 500 MG: 500 TABLET ORAL at 09:48

## 2023-05-18 NOTE — DISCHARGE INSTRUCTIONS
Pain control regimen:  1 ) Ibuprofen, take one tablet every six hours as needed  Do not take any additional ibuprofen, Advil, motrin or aleve  2  ) Tylenol (Acetaminophen) Dosing: Take EITHER three tablets of regular strength (325 mg) or two tablets of extra strength (500 mg) every 8 hours around the clock  3 ) Robaxin, taking one tablet every 8 hours as needed  This is a muscle relaxer and can make you drowsy  Follow up with your family doctor if symptoms persist over the next week

## 2023-05-18 NOTE — Clinical Note
Tobias Never was seen and treated in our emergency department on 5/18/2023  Diagnosis:     Esteban Munroe  may return to work on return date  He may return on this date: 05/19/2023         If you have any questions or concerns, please don't hesitate to call        Ann    ______________________________           _______________          _______________  Hospital Representative                              Date                                Time

## 2023-05-18 NOTE — ED PROVIDER NOTES
"History  Chief Complaint   Patient presents with   • Back Pain     Pt reports upper back, shoulder and neck pain that started last night  No injury reported  A 30 yo male with no significant pmhx; presents with upper back pain that started last night  Pain initially started in the right trapezius muscle, and has since spread across the upper back  Pain is worse with movement  Pain does not radiate down the extremities  He denies associated weakness and paresthesias  He denies low back pain  Patient denies inciting injury  He did take a \"muscle relaxer\" this morning with minimal relief of his symptoms  Patient otherwise denies fever, chills, chest pain, shortness of breath, abdominal pain, nausea, vomiting, diarrhea, peripheral edema and rashes  Assessment and plan: Thoracic back pain, likely muscular in nature  He is neurologically intact without radicular symptoms  Will treat symptomatically  History provided by:  Patient and medical records  Back Pain      None       Past Medical History:   Diagnosis Date   • Asthma        History reviewed  No pertinent surgical history  History reviewed  No pertinent family history  I have reviewed and agree with the history as documented  E-Cigarette/Vaping   • E-Cigarette Use Never User      E-Cigarette/Vaping Substances   • Nicotine No    • THC No    • CBD No    • Flavoring No      Social History     Tobacco Use   • Smoking status: Every Day     Packs/day: 1 00     Types: Cigarettes   • Smokeless tobacco: Never   Vaping Use   • Vaping Use: Never used   Substance Use Topics   • Alcohol use: Yes     Comment: socially   • Drug use: Yes     Types: Marijuana     Comment: daily        Review of Systems   Musculoskeletal: Positive for back pain  All other systems reviewed and are negative  Physical Exam  Physical Exam  General Appearance: alert and oriented, appears uncomfortable  Skin:  Warm, dry, intact    No cyanosis  HEENT: Atraumatic, " normocephalic  No eye drainage  Normal hearing  Moist mucous membranes  Neck: Supple, trachea midline  No midline cervical spine tenderness  No trapezius muscle tenderness  Cardiac: RRR; no murmurs, rub, gallops  No pedal edema, 2+ pulses  Pulmonary: lungs CTAB; no wheezes, rales, rhonchi  Gastrointestinal: abdomen soft, nontender, nondistended; no guarding or rebound tenderness; good bowel sounds, no mass or bruits  Extremities:  No midline spinal or paraspinal tenderness  Posterior rib cage nontender  Guarded movement to the thoracic spine secondary to pain  No deformities  No calf tenderness, no clubbing  Neuro:  no focal motor or sensory deficits, CN 2-12 grossly intact  Psych:  Normal mood and affect, normal judgement and insight     Vital Signs  ED Triage Vitals [05/18/23 0923]   Temperature Pulse Respirations Blood Pressure SpO2   98 6 °F (37 °C) 68 16 140/56 100 %      Temp src Heart Rate Source Patient Position - Orthostatic VS BP Location FiO2 (%)   -- Monitor Sitting Right arm --      Pain Score       10 - Worst Possible Pain           Vitals:    05/18/23 0923   BP: 140/56   Pulse: 68   Patient Position - Orthostatic VS: Sitting         Visual Acuity      ED Medications  Medications   ketorolac (TORADOL) injection 30 mg (30 mg Intramuscular Given 5/18/23 0950)   acetaminophen (TYLENOL) tablet 975 mg (975 mg Oral Given 5/18/23 0947)   methocarbamol (ROBAXIN) tablet 500 mg (500 mg Oral Given 5/18/23 0948)       Diagnostic Studies  Results Reviewed     None                 No orders to display              Procedures  Procedures         ED Course                               SBIRT 22yo+    Flowsheet Row Most Recent Value   Initial Alcohol Screen: US AUDIT-C     1  How often do you have a drink containing alcohol? 2 Filed at: 05/18/2023 0954   2  How many drinks containing alcohol do you have on a typical day you are drinking? 0 Filed at: 05/18/2023 0954   3a  Male UNDER 65:  How often do you "have five or more drinks on one occasion? 0 Filed at: 05/18/2023 0954   3b  FEMALE Any Age, or MALE 65+: How often do you have 4 or more drinks on one occassion? 0 Filed at: 05/18/2023 0954   Audit-C Score 2 Filed at: 05/18/2023 4839   BALDO: How many times in the past year have you    Used an illegal drug or used a prescription medication for non-medical reasons? Daily or Almost Daily  [marijuana] Filed at: 05/18/2023 0954   DAST-10: In the past 12 months       1  Have you used drugs other than those required for medical reasons? 1 Filed at: 05/18/2023 0954   2  Do you use more than one drug at a time? 0 Filed at: 05/18/2023 0954   3  Have you had medical problems as a result of your drug use (e g , memory loss, hepatitis, convulsions, bleeding, etc )? 0 Filed at: 05/18/2023 0954   4  Have you had \"blackouts\" or \"flashbacks\" as a result of drug use? YesNo 0 Filed at: 05/18/2023 0954   5  Do you ever feel bad or guilty about your drug use? 0 Filed at: 05/18/2023 0954   6  Does your spouse (or parent) ever complain about your involvement with drugs? 0 Filed at: 05/18/2023 0954   7  Have you neglected your family because of your use of drugs? 0 Filed at: 05/18/2023 0954   8  Have you engaged in illegal activities in order to obtain drugs? 0 Filed at: 05/18/2023 0954   9  Have you ever experienced withdrawal symptoms (felt sick) when you stopped taking drugs? 0 Filed at: 05/18/2023 0954   10  Are you always able to stop using drugs when you want to? 0 Filed at: 05/18/2023 0954   DAST-10 Score 1 Filed at: 05/18/2023 7422                    Medical Decision Making  Risk  OTC drugs  Prescription drug management            Disposition  Final diagnoses:   Spasm of thoracic back muscle     Time reflects when diagnosis was documented in both MDM as applicable and the Disposition within this note     Time User Action Codes Description Comment    5/18/2023 10:02 Crystal Foxer Add [[id number]] Spasm of thoracic back muscle     " ED Disposition     ED Disposition   Discharge    Condition   Stable    Date/Time   Thu May 18, 2023 10:02 AM    Comment   Charis Lindsey discharge to home/self care  Follow-up Information     Follow up With Specialties Details Why Contact Info Additional [address] [facility] Schedule an appointment as soon as possible for a visit  To establish care with a family physician [address], [address] [facility] [phone]  [address], [address], [address] [place], [place], [address]          Discharge Medication List as of 5/18/2023 10:03 AM      START taking these medications    Details   ibuprofen (MOTRIN) 600 mg tablet Take 1 tablet (600 mg total) by mouth every 6 (six) hours as needed for mild pain, Starting Thu 5/18/2023, Print      methocarbamol (ROBAXIN) 500 mg tablet Take 1 tablet (500 mg total) by mouth every 8 (eight) hours as needed for muscle spasms, Starting Thu 5/18/2023, Print             No discharge procedures on file      PDMP Review     None          ED Provider  Electronically Signed by           Gt White DO  05/18/23 4878